# Patient Record
Sex: MALE | Race: BLACK OR AFRICAN AMERICAN | NOT HISPANIC OR LATINO | Employment: UNEMPLOYED | URBAN - METROPOLITAN AREA
[De-identification: names, ages, dates, MRNs, and addresses within clinical notes are randomized per-mention and may not be internally consistent; named-entity substitution may affect disease eponyms.]

---

## 2017-01-25 ENCOUNTER — OFFICE VISIT (OUTPATIENT)
Dept: PODIATRY | Facility: CLINIC | Age: 77
End: 2017-01-25
Payer: MEDICARE

## 2017-01-25 VITALS
WEIGHT: 166 LBS | SYSTOLIC BLOOD PRESSURE: 109 MMHG | BODY MASS INDEX: 26.68 KG/M2 | HEART RATE: 59 BPM | HEIGHT: 66 IN | DIASTOLIC BLOOD PRESSURE: 63 MMHG

## 2017-01-25 DIAGNOSIS — E08.621: Primary | ICD-10-CM

## 2017-01-25 DIAGNOSIS — B35.1 DERMATOPHYTOSIS OF NAIL: ICD-10-CM

## 2017-01-25 DIAGNOSIS — L97.502: Primary | ICD-10-CM

## 2017-01-25 DIAGNOSIS — I73.9 PVD (PERIPHERAL VASCULAR DISEASE): ICD-10-CM

## 2017-01-25 PROCEDURE — 11043 DBRDMT MUSC&/FSCA 1ST 20/<: CPT | Mod: Q8,PBBFAC,PO | Performed by: PODIATRIST

## 2017-01-25 PROCEDURE — 99213 OFFICE O/P EST LOW 20 MIN: CPT | Mod: PBBFAC,PO | Performed by: PODIATRIST

## 2017-01-25 PROCEDURE — 11043 DBRDMT MUSC&/FSCA 1ST 20/<: CPT | Mod: S$PBB,,, | Performed by: PODIATRIST

## 2017-01-25 PROCEDURE — 99999 PR PBB SHADOW E&M-EST. PATIENT-LVL III: CPT | Mod: PBBFAC,,, | Performed by: PODIATRIST

## 2017-01-25 PROCEDURE — 99214 OFFICE O/P EST MOD 30 MIN: CPT | Mod: 25,S$PBB,, | Performed by: PODIATRIST

## 2017-01-25 PROCEDURE — 11721 DEBRIDE NAIL 6 OR MORE: CPT | Mod: Q8,PBBFAC,PO,59 | Performed by: PODIATRIST

## 2017-01-25 PROCEDURE — 11721 DEBRIDE NAIL 6 OR MORE: CPT | Mod: 59,Q8,S$PBB, | Performed by: PODIATRIST

## 2017-01-25 RX ORDER — TRIAZOLAM 0.25 MG/1
TABLET ORAL
COMMUNITY
Start: 2017-01-15

## 2017-01-25 RX ORDER — ALLOPURINOL 100 MG/1
100 TABLET ORAL DAILY
Refills: 2 | COMMUNITY
Start: 2016-12-21

## 2017-01-25 RX ORDER — ERGOCALCIFEROL 1.25 MG/1
50000 CAPSULE ORAL
Refills: 5 | COMMUNITY
Start: 2016-12-21

## 2017-01-25 NOTE — PATIENT INSTRUCTIONS
Podiatry Department : Dr. Isidro  Wound Care Instructions     It is very important that you follow the below instructions carefully for treatment of your foot wound/infection. This was demonstrated and explained to you in clinic as well as provided in written instructions. If there are any problems, feel free to call our office for advice.    1. CLEANSE WOUND WITH Saline or wound cleanser (this can be purchased over the counter/See Supply list that was provided to you in clinic)    2. APPLY SANTYL wound gel (dime size amount) TO WOUND     3. Pad with dry 4x4 gauzes    4. Wrap foot with kerlix dressing and tape    5. Do NOT get your wound wet in the shower! This can complicate the wound by causing further infection! You can purchase a cast shower bag at [facility] for approximately 15.00 or you can apply a plastic bag over the foot and secure it with duct tape to prevent the wound from getting wet.     6. Perform wound dressing changes DAILY (or as instructed by Dr. Isidro)    You should perform the above noted wound care until appt is scheduled with [facility].      IF ANY SIGNS OF WORSENING OF INFECTION, SUCH AS PURULENT DRAINAGE, REDNESS, YOU EXPERIENCE A FEVER OR INCREASED PAIN--REPORT BACK TO CLINIC [phone]. IF AFTER CLINIC HOURS, REPORT TO EMERGENCY ROOM

## 2017-01-25 NOTE — PROGRESS NOTES
"PODIATRY NOTE    CHIEF COMPLAINT  Chief Complaint   Patient presents with    Foot Ulcer     Right foot ulcer. No drainage malodorous. Denies pain    Routine Foot Care     Last visit with PCP Dr. Bertrand 12/2016         HPI    SUBJECTIVE: Francois Felipe Sr. is a 76 y.o. male who  has a past medical history of Arthritis; Diabetes mellitus, type 2; Heart disease; Hyperlipidemia; and Hypertension. Francois presents to clinic for f/u right great toe ulcer. He also has established care with PCP: Dr. Bertrand. Pt does admit to possible trauma several years ago to his right great toe which explains xray findings. He denies any N/V/F.    1/25/2017  Mr. Felipe presents today follow up diabetic foot ulcer. He did not return to clinic as scheduled since November. He states his daughter is a nurse and has been performing wound care to right foot. He denies any purulent drainage. He is here today for f/u on wound care and at risk foot care due to thickened, elongated, painful toenails.    HgA1c: No results found for: HGBA1C    Dr. Parekh PCP: 11/11/16    REVIEW OF SYSTEMS  General: Denies any fever or chills  Chest: Denies shortness of breath, wheezing, coughing, or sputum production  Heart: Denies chest pain.  As noted above and per history of current illness above, otherwise negative in the remainder of the 14 systems.     PHYSICAL EXAM  Vitals:    01/25/17 1435   BP: 109/63   Pulse: (!) 59   Weight: 75.3 kg (166 lb 0.1 oz)   Height: 5' 6" (1.676 m)   PainSc: 0-No pain       GEN:  This patient is well-developed, well-nourished and appears stated age, well-oriented to person, place and time, and cooperative and pleasant on today's visit.      LOWER EXTREMITY  Vascular:   · Non palpable pedal pulses  · Skin temperature warm to warm from prox to distally  · CFT <5 secs b/l  · There is lower extremity edema noted b/l.   · There are chronic venous stasis changes    Dermatologic:   · Thickened, dystrophic, elongated toenails with " subungal debris 1-5 b/l.   · No erythema noted  · Webspaces are C/D/I B/L.  · There is full thickeness ulceration sub 1st metatarsal head RIGHT foot measuring appx 2 cm x 2 cm with granular wound bed, appx 2 mm hyperkeratotic rim around ulceration with mild maceration around periwound area      · Skin texture and turgor dry  · There is no pedal hair growth noted    Neurologic:  · Vibratory sensation diminished at level of Hallux IPJ b/l   · Protective sensation absent at 0/10 sites upon examination with [place] Weinsten 5.07 g monofilament.   · Propioception intact at 1st MTPJ b/l.   · Achilles and patellar deep tendon reflexes intact  · Babinski reflex absent b/l. Light touch and sharp/dull sensation intact b/l.    Musculoskeletal/Orthopedic:  · No symptomatic structural abnormalities noted.   · Muscle strength is 5/5 for foot inverters, everters, plantarflexors, and dorsiflexors. Muscle tone is normal.  · Pain free range of motion in all four quadrants with stiffness and limitation b/l      ASSESSMENT  1. Diabetic ulcer of foot associated with diabetes mellitus due to underlying condition, with fat layer exposed     2. PVD (peripheral vascular disease)     3. Dermatophytosis of nail         Plan:  -Discuss presenting problems, etiology, pathologic processes and management options with patient today.   -I counseled the patient on their conditions, their implications and medical management. An in depth discussion on diabetic management, risk prevention, amputation prevention verbally and provided educational literature in written format.    -Goal of treatment: Prevention of infection and wound healing  -The wound is cleansed, debrided of foreign material as much as possible, and dressed.  -With patient's permission, the ulcer was sharply excisionally debrided of viable and nonviable tissue down to good bleeding granular tissue base utilizing sterile #15 blade and tissue nipper and forceps. Wound was irrigated with  sterile saline and bleeding was controlled with direct pressure. Minimal blood loss. The patient tolerated this well. Wound was then dressed with silvasorb wound gel and dry sterile dressing. Patient was given instructions on daily dressing changes. Patient was also instructed on the importance of keeping the wound and dressings clean dry and intact and keeping pressure off the wound area until complete healing of the wound.  -Rx for SANTYL wound gel and instructions provided on at home care; Surgical shoe with offloading pad applied  -The patient is alerted to watch for any signs of infection (redness, pus, pain, increased swelling or fever) and call if such occurs.   -With patient's permission, nails were aggressively reduced and debrided x 10 to their soft tissue attachment mechanically and with electric , removing all offending nail and debris. Patient relates relief following the procedure.   -Referral placed for [facility] based on poor healing and noncompliance. Pt would benefit from total contact casting per wound care physician     Future Appointments  Date Time Provider Department Center   2/15/2017 2:20 PM [facility] [place]   5/1/2017 1:20 PM Dahlia Isidro DPM [facility] [place]         Report Electronically Signed By:  Dahlia Isidro DPM   Podiatric Medicine & Surgery  [facility]  1/25/2017

## 2017-01-25 NOTE — MR AVS SNAPSHOT
[facility]  [address] [place] [address]  Phone: [phone]  Fax: [phone]                  Francoisissac Felipe    2017 2:00 PM   Office Visit    Description:  Male : 1940   Provider:  Dahlia Isidro DPM   Department:  [facility]           Reason for Visit     Foot Ulcer     Routine Foot Care           Diagnoses this Visit        Comments    Diabetic ulcer of foot associated with diabetes mellitus due to underlying condition, with fat layer exposed    -  Primary     PVD (peripheral vascular disease)                To Do List           Future Appointments        Provider Department Dept Phone    2/15/2017 2:20 PM Dahlia Isidro DPM [facility] [phone]    2017 1:20 PM Dahlia Isidro DPM [facility] [phone]      Goals (5 Years of Data)     None      [facility]     [facility] On [facility] Nurse Care Line -  Assistance  Registered nurses in the [facility] provide clinical advisement, health education, appointment booking, and other advisory services.  Call for this free service at [phone].             Medications           Message regarding Medications     Verify the changes and/or additions to your medication regime listed below are the same as discussed with your clinician today.  If any of these changes or additions are incorrect, please notify your healthcare provider.        STOP taking these medications     SIMVASTATIN ORAL Take by mouth.           Verify that the below list of medications is an accurate representation of the medications you are currently taking.  If none reported, the list may be blank. If incorrect, please contact your healthcare provider. Carry this list with you in case of emergency.           Current Medications     allopurinol (ZYLOPRIM) 100 MG tablet Take 100 mg by mouth once daily.    aspirin 81 MG Chew Take 81 mg by mouth once daily.    atorvastatin (LIPITOR) 80 MG tablet Take 80 mg by mouth once  "daily.    carvedilol (COREG) 6.25 MG tablet TAKE 1 TABLET ORALLY 2 TIMES A DAY.    clopidogrel (PLAVIX) 75 mg tablet TAKE 1 TABLET ORALLY ONCE A DAY.    furosemide (LASIX) 40 MG tablet Take 40 mg by mouth 2 (two) times daily.    insulin asp prt-insulin aspart, NOVOLOG 70/30, (NOVOLOG MIX 70-30) 100 unit/mL (70-30) Soln Inject into the skin 2 (two) times daily before meals.    KLOR-CON 10 10 mEq TbSR TAKE 1 TABLET ORALLY ONCE A DAY.    lisinopril (PRINIVIL,ZESTRIL) 40 MG tablet TAKE 1 TABLET ORALLY ONCE A DAY.    triazolam (HALCION) 0.25 MG Tab     VITAMIN D2 50,000 unit capsule Take 50,000 Units by mouth every 7 days.    NITROGLYCERIN (NITROSTAT SL) Place under the tongue.           Clinical Reference Information           Vital Signs - Last Recorded  Most recent update: 1/25/2017  2:39 PM by Annia Levine LPN    BP Pulse Ht Wt BMI    109/63 (BP Location: Right arm, Patient Position: Sitting, BP Method: Automatic) (!) 59 5' 6" (1.676 m) 75.3 kg (166 lb 0.1 oz) 26.79 kg/m2      Blood Pressure          Most Recent Value    BP  109/63      Allergies as of 1/25/2017     No Known Allergies      Immunizations Administered on Date of Encounter - 1/25/2017     None      [facility] Sign-Up     Activating your [facility] account is as easy as 1-2-3!     1) Visit [email or web address], select Sign Up Now, enter this activation code and your date of birth, then select Next.  [email or web address]  Expires: 3/11/2017  3:13 PM      2) Create a username and password to use when you visit [facility] in the future and select a security question in case you lose your password and select Next.    3) Enter your e-mail address and click Sign Up!    Additional Information  If you have questions, please e-mail [email or web address] or call [phone] to talk to our [facility] staff. Remember, [facility] is NOT to be used for urgent needs. For medical emergencies, dial 911.         Instructions       Podiatry Department : Dr. Isidro  Wound Care " Instructions     It is very important that you follow the below instructions carefully for treatment of your foot wound/infection. This was demonstrated and explained to you in clinic as well as provided in written instructions. If there are any problems, feel free to call our office for advice.    1. CLEANSE WOUND WITH Saline or wound cleanser (this can be purchased over the counter/See Supply list that was provided to you in clinic)    2. APPLY SANTYL wound gel (dime size amount) TO WOUND     3. Pad with dry 4x4 gauzes    4. Wrap foot with kerlix dressing and tape    5. Do NOT get your wound wet in the shower! This can complicate the wound by causing further infection! You can purchase a cast shower bag at [facility] for approximately 15.00 or you can apply a plastic bag over the foot and secure it with duct tape to prevent the wound from getting wet.     6. Perform wound dressing changes DAILY (or as instructed by Dr. Isidro)    You should perform the above noted wound care until appt is scheduled with [facility].      IF ANY SIGNS OF WORSENING OF INFECTION, SUCH AS PURULENT DRAINAGE, REDNESS, YOU EXPERIENCE A FEVER OR INCREASED PAIN--REPORT BACK TO CLINIC [phone]. IF AFTER CLINIC HOURS, REPORT TO EMERGENCY ROOM

## 2017-02-15 ENCOUNTER — TELEPHONE (OUTPATIENT)
Dept: PODIATRY | Facility: CLINIC | Age: 77
End: 2017-02-15

## 2017-02-15 NOTE — TELEPHONE ENCOUNTER
Spoke to patient's daughter, Any, who informed nurse that patient was not at appointment today with Dr. Dwaine DPM due to patient being scheduled with vascular surgery at 2:30p at [facility]. Daughter states that she thought that patient did not need to be seen by podiatry again until after therapy is completed at [facility]. Daughter states that she would like to know if patient can be seen after lab results are received from [facility]. Informed that I would consult with Dr. Dwaine DPM and that she will be contacted after new orders are received. Verbalized understanding.

## 2017-02-15 NOTE — TELEPHONE ENCOUNTER
Spoke to patient's daughter and educated her that per Dr. Isidro, DESTINEE patient does not need to follow up with podiatry during treatment [facility]. Patient can return to clinic as needed and for scheduled routine nail care. Verbalized understanding and ended call pleasantly.

## 2017-02-15 NOTE — TELEPHONE ENCOUNTER
Spoke to emergency contact, Yair Dugan, who informed me that patient was did not come to appointment today with Dr. Dwaine DPM because patient was scheduled to see vascular surgery at 2:30p at [facility]. I was given contact information for patient's daughter, Any, to contact in reference to appointment today with Dr. Dwaine DPM.

## 2017-04-19 ENCOUNTER — TELEPHONE (OUTPATIENT)
Dept: PODIATRY | Facility: CLINIC | Age: 77
End: 2017-04-19

## 2017-04-19 NOTE — TELEPHONE ENCOUNTER
----- Message from Marina Valdez sent at 4/19/2017  2:12 PM CDT -----  Contact: Any daughter   Calling to see if patient can be seen today. Please call Any @ [phone]. Thanks, ivanna

## 2017-04-19 NOTE — TELEPHONE ENCOUNTER
Left message for patient to return call upon receiving voicemail.    Nati Ann MA  Office of Dr. Dahlia Isidro DPM   Podiatry Department, [facility]

## 2017-04-19 NOTE — TELEPHONE ENCOUNTER
Spoke to patient's daughter, Any Felipe, who states that patient would like same day access for nail care. Educated on next available appointment times. Daughter states that patient is currently scheduled on Monday 4/24/17 at 4:40p per patient request and doesn't request change in appointment at this time. I verbalized understanding and ended call pleasantly.

## 2017-06-28 ENCOUNTER — TELEPHONE (OUTPATIENT)
Dept: PODIATRY | Facility: CLINIC | Age: 77
End: 2017-06-28

## 2017-06-28 NOTE — TELEPHONE ENCOUNTER
Contacted pt's daughter and scheduled pt for 6/29/17 at 7am, pt's daughter confirmed appt.  Nati Ann MA  Office of Dr. Dahlia Isidro, DPM   Podiatry Department, [facility]

## 2017-06-28 NOTE — TELEPHONE ENCOUNTER
----- Message from Tyson Villela sent at 6/28/2017 10:38 AM CDT -----  Contact: Alize AUGUSTA pt's Daughter (Virgil's mother) [phone]  The pt is now in the area and would like to be seen today to have his toe nails clipped.  The pt can be reached at [phone]

## 2017-10-17 ENCOUNTER — OFFICE VISIT (OUTPATIENT)
Dept: PODIATRY | Facility: CLINIC | Age: 77
End: 2017-10-17
Payer: MEDICARE

## 2017-10-17 VITALS
HEIGHT: 66 IN | WEIGHT: 155.88 LBS | BODY MASS INDEX: 25.05 KG/M2 | DIASTOLIC BLOOD PRESSURE: 49 MMHG | HEART RATE: 69 BPM | SYSTOLIC BLOOD PRESSURE: 93 MMHG

## 2017-10-17 DIAGNOSIS — I73.9 PVD (PERIPHERAL VASCULAR DISEASE): ICD-10-CM

## 2017-10-17 DIAGNOSIS — B35.1 DERMATOPHYTOSIS OF NAIL: Primary | ICD-10-CM

## 2017-10-17 DIAGNOSIS — I77.1 ARTERIAL INSUFFICIENCY: ICD-10-CM

## 2017-10-17 PROCEDURE — 99213 OFFICE O/P EST LOW 20 MIN: CPT | Mod: PBBFAC,PO | Performed by: PODIATRIST

## 2017-10-17 PROCEDURE — 99999 PR PBB SHADOW E&M-EST. PATIENT-LVL III: CPT | Mod: PBBFAC,,, | Performed by: PODIATRIST

## 2017-10-17 PROCEDURE — 11721 DEBRIDE NAIL 6 OR MORE: CPT | Mod: Q8,PBBFAC,PO | Performed by: PODIATRIST

## 2017-10-17 PROCEDURE — 99213 OFFICE O/P EST LOW 20 MIN: CPT | Mod: 25,S$PBB,, | Performed by: PODIATRIST

## 2017-10-17 PROCEDURE — 11721 DEBRIDE NAIL 6 OR MORE: CPT | Mod: Q8,S$PBB,, | Performed by: PODIATRIST

## 2017-10-17 RX ORDER — METOLAZONE 2.5 MG/1
TABLET ORAL
COMMUNITY
Start: 2017-10-03

## 2017-10-17 NOTE — PROGRESS NOTES
"PODIATRY NOTE    CHIEF COMPLAINT  Chief Complaint   Patient presents with    Routine Foot Care     Dm2 Last visit with Dr. Parekh          [facility]    SUBJECTIVE: Francois Felipe Sr. is a 77 y.o. male who  has a past medical history of Arthritis; Diabetes mellitus, type 2; Heart disease; Hyperlipidemia; and Hypertension. Francois presents to clinic for at risk nail care. Pt states he has had vascular intervention for decreased blood flow by outside vascular surgeon. He is being [facility]- weekly for right foot ulcer. He presents with unna boot on Right. He denies any pain. He complains about thickened, elongated, fungal toenails. He has no further pedal complaints.      REVIEW OF SYSTEMS  General: Denies any fever or chills  Chest: Denies shortness of breath, wheezing, coughing, or sputum production  Heart: Denies chest pain.  As noted above and per history of current illness above, otherwise negative in the remainder of the 14 systems.     PHYSICAL EXAM  Vitals:    10/17/17 1118   BP: (!) 93/49   Pulse: 69   Weight: 70.7 kg (155 lb 13.8 oz)   Height: 5' 6" (1.676 m)   PainSc: 0-No pain       GEN:  This patient is well-developed, well-nourished and appears stated age, well-oriented to person, place and time, and cooperative and pleasant on today's visit.      LOWER EXTREMITY  Vascular:   · Non palpable pedal pulses  · Skin temperature warm to warm from prox to distally  · CFT <5 secs b/l  · There is lower extremity edema noted b/l.   · There are chronic venous stasis changes    Dermatologic:   · Unna boot intact to RIGHT Lower extremity  · Thickened, dystrophic, elongated toenails with subungal debris 1-5 b/l.   · No erythema noted  · Webspaces are C/D/I B/L.  · Skin texture and turgor dry  · There is no pedal hair growth noted    Neurologic:  · Vibratory sensation diminished at level of Hallux IPJ b/l   · Protective sensation absent at 0/10 sites upon examination with [place] Weinsten 5.07 g " monofilament.   · Propioception intact at 1st MTPJ b/l.   · Achilles and patellar deep tendon reflexes intact  · Babinski reflex absent b/l. Light touch and sharp/dull sensation intact b/l.    Musculoskeletal/Orthopedic:  · No symptomatic structural abnormalities noted.   · Muscle strength is 5/5 for foot inverters, everters, plantarflexors, and dorsiflexors. Muscle tone is normal.  · Pain free range of motion in all four quadrants with stiffness and limitation b/l      ASSESSMENT  1. Dermatophytosis of nail     2. Arterial insufficiency     3. PVD (peripheral vascular disease)         Plan:  -Discuss presenting problems, etiology, pathologic processes and management options with patient today.   -I counseled the patient on their conditions, their implications and medical management.  -With patient's permission, nails were aggressively reduced and debrided x 10 to their soft tissue attachment mechanically and with electric , removing all offending nail and debris. Patient relates relief following the procedure.   -RTC in 3 months for at risk foot care    Future Appointments  Date Time Provider Department Center   1/18/2018 11:00 AM Dahlia Isidro DPM [facility] [place]         Report Electronically Signed By:  Dahlia Isidro DPM   Podiatric Medicine & Surgery  [facility]  10/17/2017